# Patient Record
Sex: MALE | NOT HISPANIC OR LATINO | Employment: FULL TIME | URBAN - METROPOLITAN AREA
[De-identification: names, ages, dates, MRNs, and addresses within clinical notes are randomized per-mention and may not be internally consistent; named-entity substitution may affect disease eponyms.]

---

## 2018-02-16 ENCOUNTER — HOSPITAL ENCOUNTER (EMERGENCY)
Facility: CLINIC | Age: 63
Discharge: HOME | End: 2018-02-16
Attending: EMERGENCY MEDICINE | Admitting: EMERGENCY MEDICINE
Payer: COMMERCIAL

## 2018-02-16 VITALS
OXYGEN SATURATION: 97 % | SYSTOLIC BLOOD PRESSURE: 126 MMHG | TEMPERATURE: 98 F | WEIGHT: 172 LBS | BODY MASS INDEX: 23.3 KG/M2 | HEART RATE: 50 BPM | DIASTOLIC BLOOD PRESSURE: 72 MMHG | RESPIRATION RATE: 16 BRPM | HEIGHT: 72 IN

## 2018-02-16 DIAGNOSIS — H43.812 PVD (POSTERIOR VITREOUS DETACHMENT), LEFT: ICD-10-CM

## 2018-02-16 PROCEDURE — 99283 EMERGENCY DEPT VISIT LOW MDM: CPT | Mod: Z6 | Performed by: EMERGENCY MEDICINE

## 2018-02-16 PROCEDURE — 99282 EMERGENCY DEPT VISIT SF MDM: CPT | Performed by: EMERGENCY MEDICINE

## 2018-02-16 ASSESSMENT — VISUAL ACUITY
OS: 20/70
OD: 20/70

## 2018-02-16 ASSESSMENT — ENCOUNTER SYMPTOMS
EYE PAIN: 0
EYE DISCHARGE: 0
PHOTOPHOBIA: 0

## 2018-02-16 NOTE — ED AVS SNAPSHOT
[facility], Emergency Department    [address] [address]    Phone:  [phone]                                       Lester Bartlett   MRN: [id number]    Department:  [facility], Emergency Department   Date of Visit:  2/16/2018           Patient Information     Date Of Birth          1955        Your diagnoses for this visit were:     PVD (posterior vitreous detachment), left        You were seen by David Vidal MD.        Discharge Instructions       Follow-up in the retina clinic in 1 month  Return if any problems or concerns    24 Hour Appointment Hotline       To make an appointment at any [facility], call [phone] ([phone]). If you don't have a family doctor or clinic, we will help you find one. [facility] clinics are conveniently located to serve the needs of you and your family.             Review of your medicines      Our records show that you are taking the medicines listed below. If these are incorrect, please call your family doctor or clinic.        Dose / Directions Last dose taken    SYNTHROID 125 MCG tablet   Dose:  125 mcg   Generic drug:  levothyroxine        Take 125 mcg by mouth daily.   Refills:  0                Procedures and tests performed during your visit     Visual Acuity      Orders Needing Specimen Collection     None      Pending Results     No orders found from 2/14/2018 to 2/17/2018.            Pending Culture Results     No orders found from 2/14/2018 to 2/17/2018.            Pending Results Instructions     If you had any lab results that were not finalized at the time of your Discharge, you can call the ED Lab Result RN at [phone]. You will be contacted by this team for any positive Lab results or changes in treatment. The nurses are available 7 days a week from 10A to 6:30P.  You can leave a message 24 hours per day and they will return your call.        Thank you for choosing [facility]       Thank you for choosing  "[facility] for your care. Our goal is always to provide you with excellent care. Hearing back from our patients is one way we can continue to improve our services. Please take a few minutes to complete the written survey that you may receive in the mail after you visit with us. Thank you!        [employer] Information     [employer] lets you send messages to your doctor, view your test results, renew your prescriptions, schedule appointments and more. To sign up, go to [email or web address] . Click on \"Log in\" on the left side of the screen, which will take you to the Welcome page. Then click on \"Sign up Now\" on the right side of the page.     You will be asked to enter the access code listed below, as well as some personal information. Please follow the directions to create your username and password.     Your access code is: [email or web address]  Expires: 2018  9:29 PM     Your access code will  in 90 days. If you need help or a new code, please call your [facility] clinic or [phone].        Care EveryWhere ID     This is your Care EveryWhere ID. This could be used by other organizations to access your [facility] medical records  [id number]        Equal Access to Services     SALAZAR ATKINS : Ken Michaels, heather jiang, qalg kamart grey, marguerite german. So [facility] [phone].    ATENCIÓN: Si habla español, tiene a ramey disposición servicios gratuitos de asistencia lingüística. Llame al [phone].    We comply with applicable federal civil rights laws and [place] laws. We do not discriminate on the basis of race, color, national origin, age, disability, sex, sexual orientation, or gender identity.            After Visit Summary       This is your record. Keep this with you and show to your community pharmacist(s) and doctor(s) at your next visit.                  "

## 2018-02-16 NOTE — ED AVS SNAPSHOT
[facility] [place], Emergency Department    [address] [place] [address]    Phone:  [phone]                                       Lester Bartlett   MRN: [id number]    Department:  [facility] [place], Emergency Department   Date of Visit:  2/16/2018           After Visit Summary Signature Page     I have received my discharge instructions, and my questions have been answered. I have discussed any challenges I see with this plan with the nurse or doctor.    ..........................................................................................................................................  Patient/Patient Representative Signature      ..........................................................................................................................................  Patient Representative Print Name and Relationship to Patient    ..................................................               ................................................  Date                                            Time    ..........................................................................................................................................  Reviewed by Signature/Title    ...................................................              ..............................................  Date                                                            Time

## 2018-02-17 NOTE — CONSULTS
OPHTHALMOLOGY CONSULT NOTE  02/16/18, 8:58 PM    Patient: Lester Barteltt  Consulted by: ED staff  Reason for consult: Flashes and floaters    HISTORY OF PRESENTING ILLNESS:     Patient is a 62 year old year old male who presents for evaluation of new onset flashes and floaters in his left eye. He noticed that he was having a floater in his left eye this afternoon and 1 hour prior to seeking medical evaluation developed flashes in the same eye. The Ophthalmology service was asked to evaluate the patient due to concerns for flashes and floaters.     Patient denies: decreased visual acuity, blurred vision, trauma    Review of systems were otherwise negative except for that which has been stated above.      OCULAR/MEDICAL/SURGICAL HISTORIES:     Past Ocular History:  Refractive error    No past medical history on file.    No past surgical history on file.      CURRENT MEDICATIONS:     No current facility-administered medications for this encounter.      Current Outpatient Prescriptions   Medication     levothyroxine (SYNTHROID) 125 MCG tablet         EXAMINATION:     VAsc : RE 20/40 , LE 20/30. (Near card 3')  Motility: Full  Confrontational Visual Field: Full   Pupils: Equal and reactive to light and accomodation with no afferent pupillary defect.  IOP RE  16 LE 21 by tonopen (<5% error).     External/Slit Lamp exam   RIGHT   Lids/lashes: No abnormality   Conj/Sclera: White and quiet   Cornea:  Clear   Ant Chamber:  Deep and quiet   Iris: dilated   Lens: Tr NS  LEFT   Lids/lashes: No abnormality   Conj/Sclera: White and quiet   Cornea:  Clear   Ant Chamber:  Deep and quiet   Iris: dilated   Lens: tr NS    Dilated Fundus Exam  Eyes Dilated? Yes   Time: 8:45 With Phenylephrine 2.5% and Mydriacyl 1%   (Normally, dilation with the above lasts about 4-6 hours)  RIGHT:   Anterior vitreous: clear   Media: clear   Optic nerve: normal contours and size   Cup to Disc Ratio: 0.3   Macula: flat and no pigment abnormality   Vessels:  normal caliber and distribution   Retinal Periphery: no holes or tears identified  LEFT:   Anterior vitreous: clear   Media: clear   Optic nerve: normal contours and size   Cup to Disc Ratio: 0.3   Macula: flat and no pigment abnormality   Vessels: normal caliber and distribution   Retinal Periphery: no holes or tears identified with scleral depression 360 degrees    ASSESSMENT/PLAN:     Ophthalmology was consulted to evaluate Lester Bartlett, who is a 62 year old male presenting with concerns for flashes and floaters, left eye.    1. Posterior vitreous detachment   - Discussed signs and symptoms of retinal tear and detachment and return precautions   - Return to clinic within 1 month for repeat evaluation and scleral depression    Please contact us with any further questions or concerns.      Rayo Varner MD  Ophthalmology, PGY-3   Pager [phone]

## 2018-02-17 NOTE — ED NOTES
Flashing lights in left eye, started 30 minutes ago  No dizziness, headache  Concern for retinal detachment

## 2018-02-17 NOTE — ED PROVIDER NOTES
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  His visual acuity is not particularly effected.  He has no previous eye surgery.  He is not a diabetic.  He has no recent trauma, sickness or injuries.  He says that he has a astigmatism his visual acuity was 20/70 OU.  I have contacted ophthalmology and requested they see him for a dilated exam.  He has no previous retinal detachment or posterior vitreous detachments.    I have reviewed the Medications, Allergies, Past Medical and Surgical History, and Social History in the Epic system.    No past medical history on file.    No past surgical history on file.    No family history on file.    Social History   Substance Use Topics     Smoking status: Never Smoker     Smokeless tobacco: Never Used     Alcohol use Not on file       No current facility-administered medications for this encounter.      Current Outpatient Prescriptions   Medication     levothyroxine (SYNTHROID) 125 MCG tablet      No Known Allergies     Review of Systems   Eyes: Positive for visual disturbance (flashes of light in left eye along with new floater). Negative for photophobia, pain and discharge.   All other systems reviewed and are negative.      Physical Exam   BP: 161/77  Pulse: 61  Temp: 98  F (36.7  C)  Resp: 16  Height: 182.9 cm (6')  Weight: 78 kg (172 lb)  SpO2: 96 %  Visual Acuity-Left: 20/70  Visual Acuity-Right: 20/70      Physical Exam   Constitutional: He is oriented to person, place, and time. He appears well-developed and well-nourished. No distress.   Eyes: EOM are normal. Pupils are equal, round, and reactive to light.   Pulmonary/Chest: No respiratory distress.   Neurological: He is alert and oriented to person, place, and time.   Psychiatric: He has a normal mood and  affect. His behavior is normal.   Nursing note and vitals reviewed.      ED Course     ED Course     Procedures               Labs Ordered and Resulted from Time of ED Arrival Up to the Time of Departure from the ED - No data to display         Assessments & Plan (with Medical Decision Making)   62-year-old male who presented to the Emergency Department with acute new floaters and flashing light in the left eye.  He was seen by ophthalmology and was diagnosed with a posterior vitreous detachment.  He will be discharged home and advised to follow-up in the retina clinic in 1 month.    I have reviewed the nursing notes.    I have reviewed the findings, diagnosis, plan and need for follow up with the patient.    New Prescriptions    No medications on file       Final diagnoses:   PVD (posterior vitreous detachment), left   I, Bi Valderrama, am serving as a trained medical scribe to document services personally performed by Chester Vidal MD, based on the provider's statements to me.   I, Chester Vidal MD, was physically present and have reviewed and verified the accuracy of this note documented by Bi Valderrama.     2/16/2018   [facility], [place], EMERGENCY DEPARTMENT     David Vidal MD  02/16/18 [id number]

## 2018-08-07 DIAGNOSIS — E03.9 HYPOTHYROIDISM, UNSPECIFIED TYPE: Primary | ICD-10-CM

## 2018-08-07 RX ORDER — LEVOTHYROXINE SODIUM 25 UG/1
25 TABLET ORAL DAILY
Qty: 90 TABLET | Refills: 0 | Status: SHIPPED | OUTPATIENT
Start: 2018-08-07 | End: 2019-05-21

## 2018-10-31 DIAGNOSIS — E03.9 HYPOTHYROIDISM, UNSPECIFIED TYPE: ICD-10-CM

## 2018-11-01 RX ORDER — LEVOTHYROXINE SODIUM 25 MCG
TABLET ORAL
Qty: 90 TABLET | Refills: 0 | OUTPATIENT
Start: 2018-11-01

## 2019-02-20 DIAGNOSIS — L08.9 WOUND INFECTION: Primary | ICD-10-CM

## 2019-02-20 DIAGNOSIS — T14.8XXA WOUND INFECTION: Primary | ICD-10-CM

## 2019-02-20 RX ORDER — SULFAMETHOXAZOLE/TRIMETHOPRIM 800-160 MG
1 TABLET ORAL 2 TIMES DAILY
Qty: 20 TABLET | Refills: 2 | Status: SHIPPED | OUTPATIENT
Start: 2019-02-20 | End: 2019-03-02

## 2019-05-21 DIAGNOSIS — E03.9 HYPOTHYROIDISM, UNSPECIFIED TYPE: Primary | ICD-10-CM

## 2019-05-21 RX ORDER — LEVOTHYROXINE SODIUM 125 MCG
125 TABLET ORAL DAILY
Qty: 90 TABLET | Refills: 3 | Status: SHIPPED | OUTPATIENT
Start: 2019-05-21

## 2019-05-21 RX ORDER — LEVOTHYROXINE SODIUM 125 MCG
125 TABLET ORAL DAILY
Qty: 90 TABLET | Refills: 3 | Status: SHIPPED | OUTPATIENT
Start: 2019-05-21 | End: 2019-05-21

## 2019-05-21 RX ORDER — LEVOTHYROXINE SODIUM 25 MCG
25 TABLET ORAL DAILY
Qty: 90 TABLET | Refills: 3 | Status: SHIPPED | OUTPATIENT
Start: 2019-05-21

## 2021-07-26 DIAGNOSIS — J01.01 ACUTE RECURRENT MAXILLARY SINUSITIS: Primary | ICD-10-CM

## 2021-07-26 RX ORDER — AMOXICILLIN AND CLAVULANATE POTASSIUM 500; 125 MG/1; MG/1
1 TABLET, FILM COATED ORAL 2 TIMES DAILY
Qty: 14 TABLET | Refills: 0 | Status: SHIPPED | OUTPATIENT
Start: 2021-07-26

## 2022-04-27 ENCOUNTER — OFFICE VISIT (OUTPATIENT)
Dept: AUDIOLOGY | Facility: OTHER | Age: 67
End: 2022-04-27
Payer: COMMERCIAL

## 2022-04-27 DIAGNOSIS — H90.3 ASYMMETRICAL SENSORINEURAL HEARING LOSS: Primary | ICD-10-CM

## 2022-04-27 PROCEDURE — 92557 COMPREHENSIVE HEARING TEST: CPT | Performed by: AUDIOLOGIST

## 2022-04-27 PROCEDURE — 99207 PR NO CHARGE LOS: CPT | Performed by: AUDIOLOGIST

## 2022-04-27 PROCEDURE — 92550 TYMPANOMETRY & REFLEX THRESH: CPT | Performed by: AUDIOLOGIST

## 2022-04-27 NOTE — PROGRESS NOTES
AUDIOLOGY REPORT    SUBJECTIVE:  Lester Bartlett is a 66 year old male who was seen in the Audiology Clinic at the [facility] for audiologic evaluation, referred by self. The patient reports that he has had hearing loss in his right ear for many years, and that it used to be relatively flat in configuration, with slightly more loss in the low frequencies. He reports gradually worsening hearing. The patient reports a history of  noise exposure and a family history of hearing loss with age. He reports that he does not have tinnitus, ear pain, ear pressure, or a history of ear problems or ear surgery. The patient was unaccompanied to today's appointment.     OBJECTIVE:  Otoscopic exam indicates ears are clear of cerumen bilaterally     Pure Tone Thresholds assessed using conventional audiometry with good  reliability from 250-8000 Hz bilaterally using insert earphones and circumaural headphones     RIGHT:  normal hearing sensitivity at 250 Hz sloping to mild to moderate essentially sensorineural hearing loss    LEFT:    normal hearing sensitivity through 1000 Hz sloping to mild to moderate sensorineural hearing loss    Tympanogram:    RIGHT: borderline normal eardrum mobility (type As)    LEFT:   normal eardrum mobility    Reflexes (reported by stimulus ear):  RIGHT: Ipsilateral is absent at frequencies tested  RIGHT: Contralateral is present at elevated levels   LEFT:   Ipsilateral is present at normal levels  LEFT:   Contralateral is absent at frequencies tested    Speech Reception Threshold:    RIGHT: 50 dB HL    LEFT:   25 dB HL    Word Recognition Score:     RIGHT: 84% at 90 dB HL using NU-6 recorded word list.    LEFT:   88% at 70 dB HL using NU-6 recorded word list.      ASSESSMENT:     ICD-10-CM    1. Asymmetrical sensorineural hearing loss  H90.3 Cmprhn Audiometry Thrld Eval & Speech Recog (38841)     Tymps / Reflex   (30877)       Today s results were discussed with the patient  in detail.     PLAN:  Patient was counseled regarding hearing loss and impact on communication. Patient is a good candidate for amplification at this time, and he is considering options at [facility]. It is recommended that the patient pursue amplification. Please call this clinic with questions regarding these results or recommendations.      Jailene Nava, CCC-A  [place] Licensed Audiologist #[id number]  4/27/2022

## 2024-06-06 ENCOUNTER — ANCILLARY PROCEDURE (OUTPATIENT)
Dept: GENERAL RADIOLOGY | Facility: CLINIC | Age: 69
End: 2024-06-06
Attending: INTERNAL MEDICINE
Payer: COMMERCIAL

## 2024-06-06 DIAGNOSIS — R76.12 POSITIVE QUANTIFERON-TB GOLD TEST: ICD-10-CM

## 2024-06-06 PROCEDURE — 99207 XR CHEST 1 VIEW, EMPLOYEE HEALTH: CPT | Mod: GC | Performed by: RADIOLOGY

## 2024-07-25 ENCOUNTER — HOSPITAL ENCOUNTER (EMERGENCY)
Facility: CLINIC | Age: 69
Discharge: HOME | End: 2024-07-25
Attending: NURSE PRACTITIONER | Admitting: NURSE PRACTITIONER
Payer: COMMERCIAL

## 2024-07-25 DIAGNOSIS — A46 ERYSIPELAS OF RIGHT LOWER EXTREMITY: ICD-10-CM

## 2024-07-25 DIAGNOSIS — A69.20 ERYTHEMA MIGRANS (LYME DISEASE): ICD-10-CM

## 2024-07-25 PROCEDURE — G0463 HOSPITAL OUTPT CLINIC VISIT: HCPCS | Performed by: NURSE PRACTITIONER

## 2024-07-25 PROCEDURE — 99203 OFFICE O/P NEW LOW 30 MIN: CPT | Performed by: NURSE PRACTITIONER

## 2024-07-25 RX ORDER — AMOXICILLIN 500 MG/1
500 CAPSULE ORAL 3 TIMES DAILY
Qty: 21 CAPSULE | Refills: 0 | Status: SHIPPED | OUTPATIENT
Start: 2024-07-25 | End: 2024-08-01

## 2024-07-25 RX ORDER — DOXYCYCLINE 100 MG/1
100 CAPSULE ORAL 2 TIMES DAILY
Qty: 14 CAPSULE | Refills: 0 | Status: SHIPPED | OUTPATIENT
Start: 2024-07-25 | End: 2024-08-01

## 2024-07-25 ASSESSMENT — ACTIVITIES OF DAILY LIVING (ADL)
ADLS_ACUITY_SCORE: 35
ADLS_ACUITY_SCORE: 35

## 2024-07-25 ASSESSMENT — COLUMBIA-SUICIDE SEVERITY RATING SCALE - C-SSRS
6. HAVE YOU EVER DONE ANYTHING, STARTED TO DO ANYTHING, OR PREPARED TO DO ANYTHING TO END YOUR LIFE?: NO
1. IN THE PAST MONTH, HAVE YOU WISHED YOU WERE DEAD OR WISHED YOU COULD GO TO SLEEP AND NOT WAKE UP?: NO
2. HAVE YOU ACTUALLY HAD ANY THOUGHTS OF KILLING YOURSELF IN THE PAST MONTH?: NO

## 2024-07-27 VITALS
TEMPERATURE: 98.9 F | DIASTOLIC BLOOD PRESSURE: 88 MMHG | OXYGEN SATURATION: 98 % | RESPIRATION RATE: 16 BRPM | SYSTOLIC BLOOD PRESSURE: 138 MMHG | HEART RATE: 54 BPM

## 2024-07-27 NOTE — ED PROVIDER NOTES
ED Provider Note  [facility]      History     Chief Complaint   Patient presents with    Tick Bite     Infection concern from a tick bite     HPI  Lester Bartlett is a 68 year old male who presents with erythema migrans on his right inner thigh which he reports finding 3 days ago, a black legged deer tick was removed by him approximately 3 days ago.  He reports that the next day he noticed erythema migrans present.  The next day he was wearing pants that were slightly tighter and brought across the hair follicles on his inner leg area surrounding erythema migrans his reddened and reported to be itching.  Patient reports that he started himself on Doxycycline the day that he remove the tick but is concerned that this may not be sufficient as he has continued to have increased redness and pruritic sensation of surrounding erythema migrans.  Denies any fever, chills, nausea, vomiting, diarrhea, abdominal pain or headache.  Denies any visual disturbance or visual loss, no neck pain or lymph node enlargement.            Allergies:  No Known Allergies    Problem List:    There are no problems to display for this patient.       Past Medical History:    No past medical history on file.    Past Surgical History:    No past surgical history on file.    Family History:    No family history on file.    Social History:  Marital Status:   [2]  Social History     Tobacco Use    Smoking status: Never    Smokeless tobacco: Never        Medications:    amoxicillin (AMOXIL) 500 MG capsule  doxycycline hyclate (VIBRAMYCIN) 100 MG capsule  amoxicillin-clavulanate (AUGMENTIN) 500-125 MG tablet  SYNTHROID 125 MCG tablet  SYNTHROID 25 MCG tablet          Review of Systems  A medically appropriate review of systems was performed with pertinent positives and negatives noted in the HPI, and all other systems negative.    Physical Exam   No data found.       Physical Exam  General: alert and in no acute distress  on arrival  Ears/Nose/Throat: ENT: Left Ear: TM intact, middle ear is not erythremic, no purulence, canal patent. Right Ear: TM intact, middle ear is not erythremic, no purulence, canal patent. Nose: No erythema or edema patent nostrils bilateral. Throat: midline uvula, non-erythremic, non-enlarged tonsils, without exudate.  No cervical adenopathy.   Head: atraumatic, normocephalic, Eyes:  non-traumatic.  Left Eyes: Non-reddened conjunctiva, no icterus, noninjected, normal pupillary response to light. Normal extraocular movement.  Right eye: Non-reddened conjunctiva, no icterus, noninjected, normal pupillary response to light. Normal extraocular movement bilateral. No drainage present.   Lungs:  nonlabored, CTA bilateral throughout.  CV: Regular rate and rhythm, extremities warm and perfused, no edema in lower extremities.  Skin: Has a 8 cm erythema migrans rash on upper right inner thigh, additionally has erythema surrounding erythema migrans. No diaphoresis and skin color normal  Neuro: Patient awake, alert, speech is fluent, no focal deficits  Psychiatric: affect/mood normal, appropriate historian.      ED Course                 Procedures                  No results found for this or any previous visit (from the past 24 hour(s)).    MEDICATIONS GIVEN IN THE EMERGENCY DEPARTMENT:  Medications - No data to display             Assessments & Plan (with Medical Decision Making)  68 year old male who presents to the Urgent Care for evaluation of erythema migrans and erythema, itching, redness extending around right inner thigh covering approximately three quarters of the inner right thigh.  Patient has been taking doxycycline for 3 days when he discovered the erythema migrans pattern rash.  Patient has become concerned because he has new erythema and redness, pruritus surrounding erythema migrans pattern rash this area on the inner right thigh is mildly swollen, nonraised, without blisters.  Because patient has been  taking doxycycline for the last 3 days there is concern that the rash surrounding erythema migrans being treated by doxycycline is a bacteria not covered well by doxycycline such as Streptococcus/Erysipelas.  Patient does not have a fever, denies chills, nausea, vomiting, headache, diarrhea swollen lymph nodes, vision changes.  Will treat erysipelas with amoxicillin 3 times daily for 7 days additionally will add dosages of doxycycline to total antibiotic course to 21 days.  Patient advised to return if at anytime he develops new onset of fever, chills, neck stiffness, changes in mentation, swollen lymph nodes despite being on amoxicillin and doxycycline.  Patient verbalized his understanding of all of this information.        I have reviewed the nursing notes.    I have reviewed the findings, diagnosis, plan and need for follow up with the patient.        NEW PRESCRIPTIONS STARTED AT TODAY'S ER VISIT  Discharge Medication List as of 7/25/2024  6:32 PM        START taking these medications    Details   amoxicillin (AMOXIL) 500 MG capsule Take 1 capsule (500 mg) by mouth 3 times daily for 7 days, Disp-21 capsule, R-0, E-Prescribe      doxycycline hyclate (VIBRAMYCIN) 100 MG capsule Take 1 capsule (100 mg) by mouth 2 times daily for 7 days, Disp-14 capsule, R-0, E-Prescribe             Final diagnoses:   Erythema migrans (Lyme disease)   Erysipelas of right lower extremity       7/25/2024   [facility] [place] EMERGENCY DEPT       Helen Devi APRN CNP  07/27/24 1911

## 2025-08-06 ENCOUNTER — OFFICE VISIT (OUTPATIENT)
Dept: OTOLARYNGOLOGY | Facility: CLINIC | Age: 70
End: 2025-08-06
Payer: COMMERCIAL

## 2025-08-06 DIAGNOSIS — H90.3 SENSORINEURAL HEARING LOSS (SNHL), BILATERAL: Primary | ICD-10-CM
